# Patient Record
(demographics unavailable — no encounter records)

---

## 2025-03-25 NOTE — PHYSICAL EXAM
[Well Developed] : well developed [No Acute Distress] : no acute distress [Normal Conjunctiva] : normal conjunctiva [Normal] : no edema, no cyanosis, no clubbing, no varicosities [Moves all extremities] : moves all extremities [Alert and Oriented] : alert and oriented

## 2025-03-25 NOTE — HISTORY OF PRESENT ILLNESS
[FreeTextEntry1] : Patient is a [place], family history of CAD, Brother  of large CVA in his 40s, Father  of malignancy at the age of 49.  Seen by Dr. León for cardiology. Workup.  Mother-  of aortic aneurysm.  has been going to the gym regularly. Has had carotid testing and was noted to be normal.  Feels well. No chest pain, no shortness of breath.  Prior hysterectomy  when she was severely anemic at the time.  Calcium score - 0.   3/23/2023 Returns to review results. Had Exercise Stress Test and Echocardiogram. Denies chest pain, shortness of breath. Has been going to the gym regularly    2023- Improved BP. Home BP monitor correlates with office. Breast cancer and s/p lumpectomy and radiation to the chest.   3/2025- Doing well. No new symptoms. Light weights.

## 2025-03-25 NOTE — HISTORY OF PRESENT ILLNESS
[FreeTextEntry1] : Patient is a [place], family history of CAD, Brother  of large CVA in his 40s, Father  of malignancy at the age of 49.  Seen by Dr. León for cardiology. Workup.  Mother-  of aortic aneurysm.  has been going to the gym regularly. Has had carotid testing and was noted to be normal.  Feels well. No chest pain, no shortness of breath.  Prior hysterectomy  when she was severely anemic at the time.  Calcium score - 0.   3/23/2023 Returns to review results. Had Exercise Stress Test and Echocardiogram. Denies chest pain, shortness of breath. Has been going to the gym regularly    2023- Improved BP. Home BP monitor correlates with office. Breast cancer and s/p lumpectomy and radiation to the chest.   3/2025- Doing well. No new symptoms. Light weights.

## 2025-03-25 NOTE — DISCUSSION/SUMMARY
[EKG obtained to assist in diagnosis and management of assessed problem(s)] : EKG obtained to assist in diagnosis and management of assessed problem(s) [FreeTextEntry1] : 1. CAD screening with risk factors of HTN, HLD and family history. Calcium score 0. Exercise Treadmill Test and Echocardiogram, both with no significant findings. 2.  Patient with mild abd plaque on ultrasound. Calcium score 0. No indication for statin at this time. 3. Hypertension - stable. Continue with irbesartan-hctz 300-12.5 mg QD .  4. Repeat labs for lipids, cmp, cbc, health manintenance.  5. Follow up in 1 year.